# Patient Record
Sex: MALE | Race: WHITE | NOT HISPANIC OR LATINO | Employment: OTHER | URBAN - NONMETROPOLITAN AREA
[De-identification: names, ages, dates, MRNs, and addresses within clinical notes are randomized per-mention and may not be internally consistent; named-entity substitution may affect disease eponyms.]

---

## 2023-10-10 ENCOUNTER — OFFICE VISIT (OUTPATIENT)
Dept: PRIMARY CARE | Facility: CLINIC | Age: 63
End: 2023-10-10
Payer: MEDICARE

## 2023-10-10 VITALS
BODY MASS INDEX: 35.14 KG/M2 | HEART RATE: 88 BPM | SYSTOLIC BLOOD PRESSURE: 142 MMHG | DIASTOLIC BLOOD PRESSURE: 64 MMHG | TEMPERATURE: 97.9 F | HEIGHT: 71 IN | OXYGEN SATURATION: 97 % | WEIGHT: 251 LBS

## 2023-10-10 DIAGNOSIS — R35.1 NOCTURIA ASSOCIATED WITH BENIGN PROSTATIC HYPERPLASIA: Primary | ICD-10-CM

## 2023-10-10 DIAGNOSIS — Z23 FLU VACCINE NEED: ICD-10-CM

## 2023-10-10 DIAGNOSIS — R03.0 ELEVATED BLOOD PRESSURE READING: ICD-10-CM

## 2023-10-10 DIAGNOSIS — N40.1 NOCTURIA ASSOCIATED WITH BENIGN PROSTATIC HYPERPLASIA: Primary | ICD-10-CM

## 2023-10-10 LAB
POC APPEARANCE, URINE: CLEAR
POC BILIRUBIN, URINE: NEGATIVE
POC BLOOD, URINE: NEGATIVE
POC COLOR, URINE: YELLOW
POC GLUCOSE, URINE: NEGATIVE MG/DL
POC KETONES, URINE: NEGATIVE MG/DL
POC LEUKOCYTES, URINE: NEGATIVE
POC NITRITE,URINE: NEGATIVE
POC PH, URINE: 7.5 PH
POC PROTEIN, URINE: NEGATIVE MG/DL
POC SPECIFIC GRAVITY, URINE: 1.02
POC UROBILINOGEN, URINE: 0.2 EU/DL

## 2023-10-10 PROCEDURE — 90682 RIV4 VACC RECOMBINANT DNA IM: CPT | Performed by: FAMILY MEDICINE

## 2023-10-10 PROCEDURE — 1036F TOBACCO NON-USER: CPT | Performed by: FAMILY MEDICINE

## 2023-10-10 PROCEDURE — 84154 ASSAY OF PSA FREE: CPT

## 2023-10-10 PROCEDURE — 85025 COMPLETE CBC W/AUTO DIFF WBC: CPT

## 2023-10-10 PROCEDURE — 80061 LIPID PANEL: CPT

## 2023-10-10 PROCEDURE — 84153 ASSAY OF PSA TOTAL: CPT

## 2023-10-10 PROCEDURE — 81003 URINALYSIS AUTO W/O SCOPE: CPT | Performed by: FAMILY MEDICINE

## 2023-10-10 PROCEDURE — 84443 ASSAY THYROID STIM HORMONE: CPT

## 2023-10-10 PROCEDURE — 99204 OFFICE O/P NEW MOD 45 MIN: CPT | Performed by: FAMILY MEDICINE

## 2023-10-10 PROCEDURE — 80053 COMPREHEN METABOLIC PANEL: CPT

## 2023-10-10 PROCEDURE — 90471 IMMUNIZATION ADMIN: CPT | Performed by: FAMILY MEDICINE

## 2023-10-10 PROCEDURE — 36415 COLL VENOUS BLD VENIPUNCTURE: CPT

## 2023-10-10 ASSESSMENT — ENCOUNTER SYMPTOMS
DYSURIA: 0
NAUSEA: 0
JOINT SWELLING: 0
EYE ITCHING: 0
APPETITE CHANGE: 0
DIFFICULTY URINATING: 1
DYSPHORIC MOOD: 0
RHINORRHEA: 0
SORE THROAT: 0
HEMATURIA: 1
ARTHRALGIAS: 0
VOMITING: 0
NUMBNESS: 0
ABDOMINAL DISTENTION: 1
FLANK PAIN: 0
ACTIVITY CHANGE: 0
DIZZINESS: 0
FEVER: 0
WEAKNESS: 0
UNEXPECTED WEIGHT CHANGE: 0
BACK PAIN: 1
COUGH: 0
BLOOD IN STOOL: 0
CONSTIPATION: 0
SLEEP DISTURBANCE: 0
EYE DISCHARGE: 0
SHORTNESS OF BREATH: 1
MYALGIAS: 0
PALPITATIONS: 0
WHEEZING: 0
DIARRHEA: 0
HEADACHES: 0
LIGHT-HEADEDNESS: 0
ROS GI COMMENTS: GERD
NERVOUS/ANXIOUS: 0
SINUS PRESSURE: 0
ABDOMINAL PAIN: 1

## 2023-10-10 ASSESSMENT — PROMIS GLOBAL HEALTH SCALE
RATE_AVERAGE_PAIN: 3
RATE_SOCIAL_SATISFACTION: GOOD
RATE_PHYSICAL_HEALTH: GOOD
RATE_AVERAGE_FATIGUE: MILD
CARRYOUT_PHYSICAL_ACTIVITIES: COMPLETELY
RATE_GENERAL_HEALTH: GOOD
CARRYOUT_SOCIAL_ACTIVITIES: GOOD
RATE_QUALITY_OF_LIFE: GOOD
EMOTIONAL_PROBLEMS: NEVER
RATE_MENTAL_HEALTH: GOOD

## 2023-10-10 NOTE — PROGRESS NOTES
"Subjective   Patient ID: Ernesto Garzon is a 62 y.o. adult who presents for Establish Care, Annual Exam, Back Pain (For months- \"soreness not pain\"), Epistaxis (Nose Bleed) (Every A.M. when blowing his nose.), and Getting winded quickly.    Back Pain  Associated symptoms include abdominal pain. Pertinent negatives include no chest pain, dysuria, fever, headaches, numbness or weakness.   Epistaxis (Nose Bleed)      no medication   Elevated b/p  History of hematuria     Review of Systems   Constitutional:  Negative for activity change, appetite change, fever and unexpected weight change.   HENT:  Positive for nosebleeds. Negative for congestion, ear pain, postnasal drip, rhinorrhea, sinus pressure and sore throat.    Eyes:  Negative for discharge, itching and visual disturbance.   Respiratory:  Positive for shortness of breath. Negative for cough and wheezing.    Cardiovascular:  Negative for chest pain, palpitations and leg swelling.   Gastrointestinal:  Positive for abdominal distention and abdominal pain. Negative for blood in stool, constipation, diarrhea, nausea and vomiting.        Gerd   Endocrine: Negative for cold intolerance, heat intolerance and polyuria.   Genitourinary:  Positive for difficulty urinating and hematuria. Negative for dysuria and flank pain.        3 x urination    Musculoskeletal:  Positive for back pain. Negative for arthralgias, gait problem, joint swelling and myalgias.   Skin:  Negative for rash.   Allergic/Immunologic: Negative for environmental allergies and food allergies.   Neurological:  Negative for dizziness, syncope, weakness, light-headedness, numbness and headaches.   Psychiatric/Behavioral:  Negative for dysphoric mood and sleep disturbance. The patient is not nervous/anxious.        Objective   /64   Pulse 88   Temp 36.6 °C (97.9 °F)   Ht 1.81 m (5' 11.25\")   Wt 114 kg (251 lb)   SpO2 97%   BMI 34.76 kg/m²     Physical Exam  Vitals and nursing note reviewed. "   Constitutional:       Appearance: Normal appearance.   HENT:      Head: Normocephalic.      Mouth/Throat:      Mouth: Mucous membranes are moist.   Cardiovascular:      Rate and Rhythm: Normal rate and regular rhythm.      Pulses: Normal pulses.      Heart sounds: Normal heart sounds. No murmur heard.     No friction rub. No gallop.   Pulmonary:      Effort: Pulmonary effort is normal. No respiratory distress.      Breath sounds: Normal breath sounds. No wheezing.   Abdominal:      General: Bowel sounds are normal. There is no distension.      Palpations: Abdomen is soft.      Tenderness: There is no abdominal tenderness.   Musculoskeletal:         General: No deformity. Normal range of motion.   Skin:     General: Skin is warm and dry.      Capillary Refill: Capillary refill takes less than 2 seconds.   Neurological:      General: No focal deficit present.      Mental Status: He is alert and oriented to person, place, and time.   Psychiatric:         Mood and Affect: Mood normal.         Assessment/Plan   Diagnoses and all orders for this visit:  Nocturia associated with benign prostatic hyperplasia  -     CBC and Auto Differential  -     TSH with reflex to Free T4 if abnormal  -     PSA, total and free  -     POCT UA Automated manually resulted  Elevated blood pressure reading  -     Comprehensive Metabolic Panel  -     Lipid Panel  -     POCT UA Automated manually resulted

## 2023-10-11 LAB
ALBUMIN SERPL BCP-MCNC: 4.4 G/DL (ref 3.4–5)
ALP SERPL-CCNC: 66 U/L (ref 33–136)
ALT SERPL W P-5'-P-CCNC: 63 U/L (ref 7–52)
ANION GAP SERPL CALC-SCNC: 16 MMOL/L (ref 10–20)
AST SERPL W P-5'-P-CCNC: 27 U/L (ref 9–39)
BASOPHILS # BLD AUTO: 0.03 X10*3/UL (ref 0–0.1)
BASOPHILS NFR BLD AUTO: 0.4 %
BILIRUB SERPL-MCNC: 0.4 MG/DL (ref 0–1.2)
BUN SERPL-MCNC: 9 MG/DL (ref 6–23)
CALCIUM SERPL-MCNC: 9.9 MG/DL (ref 8.6–10.6)
CHLORIDE SERPL-SCNC: 102 MMOL/L (ref 98–107)
CHOLEST SERPL-MCNC: 184 MG/DL (ref 0–199)
CHOLESTEROL/HDL RATIO: 5.2
CO2 SERPL-SCNC: 27 MMOL/L (ref 21–32)
CREAT SERPL-MCNC: 0.91 MG/DL (ref 0.5–1.3)
EOSINOPHIL # BLD AUTO: 0.3 X10*3/UL (ref 0–0.7)
EOSINOPHIL NFR BLD AUTO: 3.7 %
ERYTHROCYTE [DISTWIDTH] IN BLOOD BY AUTOMATED COUNT: 12.9 % (ref 11.5–14.5)
GFR SERPL CREATININE-BSD FRML MDRD: 71 ML/MIN/1.73M*2
GLUCOSE SERPL-MCNC: 97 MG/DL (ref 74–99)
HCT VFR BLD AUTO: 47 % (ref 36–52)
HDLC SERPL-MCNC: 35.5 MG/DL
HGB BLD-MCNC: 15 G/DL (ref 12–17.5)
IMM GRANULOCYTES # BLD AUTO: 0.03 X10*3/UL (ref 0–0.7)
IMM GRANULOCYTES NFR BLD AUTO: 0.4 % (ref 0–0.9)
LDLC SERPL CALC-MCNC: 84 MG/DL (ref 140–190)
LYMPHOCYTES # BLD AUTO: 1.7 X10*3/UL (ref 1.2–4.8)
LYMPHOCYTES NFR BLD AUTO: 21.1 %
MCH RBC QN AUTO: 32.9 PG (ref 26–34)
MCHC RBC AUTO-ENTMCNC: 31.9 G/DL (ref 32–36)
MCV RBC AUTO: 103 FL (ref 80–100)
MONOCYTES # BLD AUTO: 0.58 X10*3/UL (ref 0.1–1)
MONOCYTES NFR BLD AUTO: 7.2 %
NEUTROPHILS # BLD AUTO: 5.42 X10*3/UL (ref 1.2–7.7)
NEUTROPHILS NFR BLD AUTO: 67.2 %
NON HDL CHOLESTEROL: 149 MG/DL (ref 0–149)
NRBC BLD-RTO: 0 /100 WBCS (ref 0–0)
PLATELET # BLD AUTO: 151 X10*3/UL (ref 150–450)
PMV BLD AUTO: 12 FL (ref 7.5–11.5)
POTASSIUM SERPL-SCNC: 4.6 MMOL/L (ref 3.5–5.3)
PROT SERPL-MCNC: 7.6 G/DL (ref 6.4–8.2)
RBC # BLD AUTO: 4.56 X10*6/UL (ref 4–5.9)
SODIUM SERPL-SCNC: 140 MMOL/L (ref 136–145)
TRIGL SERPL-MCNC: 322 MG/DL (ref 0–149)
TSH SERPL-ACNC: 3.39 MIU/L (ref 0.44–3.98)
VLDL: 64 MG/DL (ref 0–40)
WBC # BLD AUTO: 8.1 X10*3/UL (ref 4.4–11.3)

## 2023-10-13 LAB
PSA FREE MFR SERPL: 17 %
PSA FREE SERPL-MCNC: 0.2 NG/ML
PSA SERPL IA-MCNC: 1.2 NG/ML (ref 0–4)

## 2023-10-17 ENCOUNTER — TELEPHONE (OUTPATIENT)
Dept: PRIMARY CARE | Facility: CLINIC | Age: 63
End: 2023-10-17
Payer: MEDICARE

## 2023-10-17 NOTE — TELEPHONE ENCOUNTER
----- Message from Kylie Sawant DO sent at 10/15/2023  6:39 PM EDT -----  Psa is normal   So is his thyroid and cbc  Blood sugar  NORMAL. KIDNEY FUNCTION IS NORMAL FOR AGE   Lipid , THE LDL or bad are elevated  I HAVE NO OLD TO COMPARE  LOWER FAT AND SALT DIET AND FOLLOW IN 6 MONTHS   LIPID AND CMP

## 2023-10-18 NOTE — TELEPHONE ENCOUNTER
Ernesto called back and notified of results/recommendations. Scheduled for lab draw 4/11/24 and follow up 4/15/24

## 2024-04-11 ENCOUNTER — CLINICAL SUPPORT (OUTPATIENT)
Dept: PRIMARY CARE | Facility: CLINIC | Age: 64
End: 2024-04-11
Payer: MEDICARE

## 2024-04-11 DIAGNOSIS — R03.0 ELEVATED BLOOD PRESSURE READING: ICD-10-CM

## 2024-04-11 LAB
ALBUMIN SERPL BCP-MCNC: 4.3 G/DL (ref 3.4–5)
ALP SERPL-CCNC: 67 U/L (ref 33–136)
ALT SERPL W P-5'-P-CCNC: 90 U/L (ref 7–52)
ANION GAP SERPL CALC-SCNC: 13 MMOL/L (ref 10–20)
AST SERPL W P-5'-P-CCNC: 41 U/L (ref 9–39)
BILIRUB SERPL-MCNC: 0.5 MG/DL (ref 0–1.2)
BUN SERPL-MCNC: 10 MG/DL (ref 6–23)
CALCIUM SERPL-MCNC: 9.6 MG/DL (ref 8.6–10.3)
CHLORIDE SERPL-SCNC: 101 MMOL/L (ref 98–107)
CHOLEST SERPL-MCNC: 208 MG/DL (ref 0–199)
CHOLESTEROL/HDL RATIO: 6
CO2 SERPL-SCNC: 30 MMOL/L (ref 21–32)
CREAT SERPL-MCNC: 0.94 MG/DL (ref 0.5–1.3)
EGFRCR SERPLBLD CKD-EPI 2021: 68 ML/MIN/1.73M*2
GLUCOSE SERPL-MCNC: 89 MG/DL (ref 74–99)
HDLC SERPL-MCNC: 34.5 MG/DL
LDLC SERPL CALC-MCNC: 118 MG/DL
NON HDL CHOLESTEROL: 174 MG/DL (ref 0–149)
POTASSIUM SERPL-SCNC: 4.6 MMOL/L (ref 3.5–5.3)
PROT SERPL-MCNC: 7.4 G/DL (ref 6.4–8.2)
SODIUM SERPL-SCNC: 139 MMOL/L (ref 136–145)
TRIGL SERPL-MCNC: 277 MG/DL (ref 0–149)
VLDL: 55 MG/DL (ref 0–40)

## 2024-04-11 PROCEDURE — 36415 COLL VENOUS BLD VENIPUNCTURE: CPT

## 2024-04-11 PROCEDURE — 80061 LIPID PANEL: CPT

## 2024-04-11 PROCEDURE — 80053 COMPREHEN METABOLIC PANEL: CPT

## 2024-04-15 ENCOUNTER — OFFICE VISIT (OUTPATIENT)
Dept: PRIMARY CARE | Facility: CLINIC | Age: 64
End: 2024-04-15
Payer: MEDICARE

## 2024-04-15 VITALS
WEIGHT: 264 LBS | DIASTOLIC BLOOD PRESSURE: 70 MMHG | SYSTOLIC BLOOD PRESSURE: 116 MMHG | BODY MASS INDEX: 36.56 KG/M2 | TEMPERATURE: 97.2 F | OXYGEN SATURATION: 97 % | HEART RATE: 97 BPM

## 2024-04-15 DIAGNOSIS — E78.5 DYSLIPIDEMIA: Primary | ICD-10-CM

## 2024-04-15 PROCEDURE — 99214 OFFICE O/P EST MOD 30 MIN: CPT | Performed by: FAMILY MEDICINE

## 2024-04-15 NOTE — PATIENT INSTRUCTIONS
THE ALCOHOL MAY BE RAISING THE CHOLESTEROL   DIETARY   DAILY ACTIVITY    FOLLOW IN 6 MONTHS   DO MORE  WATER MAG AND POTASSIUM

## 2024-04-15 NOTE — PROGRESS NOTES
Subjective   Patient ID: Ernesto Garzon is a 63 y.o. adult who presents for Hip Pain (R SIDED), Ankle Pain (BILATERAL), and Follow-up (Discuss labwork-in chart.).  ALSO LEG AND NECK CRAMPS   TRYING MAGNESIUM   TRY MORE WATER     HPI SEE HISTORY DISCUSSED ABOVE  SAW ONE OTHER TIME FOR BPH     DISCUSSED HIS LIPID PANEL WHICH IS ABNORMAL AND MAY RELATED TO HIS ALCOHOL CONSUMPTION   LIVER ENZYMES ABNORMAL AND NEED FOLLOWED     Review of Systems   Constitutional:  Negative for activity change, appetite change, fever and unexpected weight change.   HENT:  Negative for congestion, ear pain, postnasal drip, rhinorrhea, sinus pressure and sore throat.    Eyes:  Negative for discharge, itching and visual disturbance.   Respiratory:  Negative for cough, shortness of breath and wheezing.    Cardiovascular:  Negative for chest pain, palpitations and leg swelling.   Gastrointestinal:  Negative for abdominal pain, blood in stool, constipation, diarrhea, nausea and vomiting.   Endocrine: Negative for cold intolerance, heat intolerance and polyuria.   Genitourinary:  Positive for difficulty urinating. Negative for dysuria, flank pain and hematuria.   Musculoskeletal:  Positive for arthralgias and myalgias. Negative for gait problem and joint swelling.        HIP AND ANKLE PAIN    Skin:  Negative for rash.   Allergic/Immunologic: Negative for environmental allergies and food allergies.   Neurological:  Negative for dizziness, syncope, weakness, light-headedness, numbness and headaches.   Psychiatric/Behavioral:  Negative for dysphoric mood and sleep disturbance. The patient is not nervous/anxious.        Objective   /70   Pulse 97   Temp 36.2 °C (97.2 °F)   Wt 120 kg (264 lb)   SpO2 97%   BMI 36.56 kg/m²     Physical Exam  Vitals and nursing note reviewed.   Constitutional:       Appearance: Normal appearance. He is obese.   HENT:      Head: Normocephalic.   Cardiovascular:      Rate and Rhythm: Normal rate and regular  rhythm.      Pulses: Normal pulses.      Heart sounds: Normal heart sounds. No murmur heard.     No friction rub. No gallop.   Pulmonary:      Effort: Pulmonary effort is normal. No respiratory distress.      Breath sounds: Normal breath sounds. No wheezing.   Abdominal:      General: Bowel sounds are normal.      Palpations: Abdomen is soft. There is no mass.      Tenderness: There is no right CVA tenderness or left CVA tenderness.   Musculoskeletal:         General: Tenderness and deformity present.      Comments: DECREASED AND PAINFUL; ROM AND MUSCLE CRAMPS    Skin:     General: Skin is warm and dry.      Capillary Refill: Capillary refill takes less than 2 seconds.   Neurological:      General: No focal deficit present.      Mental Status: He is alert and oriented to person, place, and time.   Psychiatric:         Mood and Affect: Mood normal.         Assessment/Plan   Diagnoses and all orders for this visit:  Dyslipidemia

## 2024-04-16 ASSESSMENT — ENCOUNTER SYMPTOMS
EYE DISCHARGE: 0
RHINORRHEA: 0
DYSPHORIC MOOD: 0
WHEEZING: 0
VOMITING: 0
FEVER: 0
EYE ITCHING: 0
SINUS PRESSURE: 0
APPETITE CHANGE: 0
WEAKNESS: 0
ABDOMINAL PAIN: 0
JOINT SWELLING: 0
NERVOUS/ANXIOUS: 0
MYALGIAS: 1
ARTHRALGIAS: 1
UNEXPECTED WEIGHT CHANGE: 0
SLEEP DISTURBANCE: 0
HEADACHES: 0
ACTIVITY CHANGE: 0
LIGHT-HEADEDNESS: 0
DIZZINESS: 0
SHORTNESS OF BREATH: 0
PALPITATIONS: 0
HEMATURIA: 0
DYSURIA: 0
SORE THROAT: 0
DIARRHEA: 0
NAUSEA: 0
COUGH: 0
NUMBNESS: 0
FLANK PAIN: 0
CONSTIPATION: 0
DIFFICULTY URINATING: 1
BLOOD IN STOOL: 0

## 2024-05-06 ENCOUNTER — OFFICE VISIT (OUTPATIENT)
Dept: PRIMARY CARE | Facility: CLINIC | Age: 64
End: 2024-05-06
Payer: MEDICARE

## 2024-05-06 VITALS
SYSTOLIC BLOOD PRESSURE: 140 MMHG | OXYGEN SATURATION: 98 % | TEMPERATURE: 97.7 F | BODY MASS INDEX: 35.73 KG/M2 | HEART RATE: 95 BPM | DIASTOLIC BLOOD PRESSURE: 78 MMHG | WEIGHT: 258 LBS

## 2024-05-06 DIAGNOSIS — R05.1 ACUTE COUGH: ICD-10-CM

## 2024-05-06 DIAGNOSIS — J20.9 ACUTE BRONCHITIS, UNSPECIFIED ORGANISM: Primary | ICD-10-CM

## 2024-05-06 LAB
FLUAV RNA RESP QL NAA+PROBE: NOT DETECTED
FLUBV RNA RESP QL NAA+PROBE: NOT DETECTED
RSV RNA RESP QL NAA+PROBE: NOT DETECTED
SARS-COV-2 RNA RESP QL NAA+PROBE: NOT DETECTED

## 2024-05-06 PROCEDURE — 99214 OFFICE O/P EST MOD 30 MIN: CPT

## 2024-05-06 PROCEDURE — 87637 SARSCOV2&INF A&B&RSV AMP PRB: CPT

## 2024-05-06 RX ORDER — PREDNISONE 10 MG/1
10 TABLET ORAL 3 TIMES DAILY
Qty: 9 TABLET | Refills: 0 | Status: SHIPPED | OUTPATIENT
Start: 2024-05-06 | End: 2024-05-09

## 2024-05-06 RX ORDER — AZITHROMYCIN 250 MG/1
TABLET, FILM COATED ORAL DAILY
Qty: 6 TABLET | Refills: 0 | Status: SHIPPED | OUTPATIENT
Start: 2024-05-06 | End: 2024-05-11

## 2024-05-06 ASSESSMENT — ENCOUNTER SYMPTOMS
SHORTNESS OF BREATH: 1
ALLERGIC/IMMUNOLOGIC NEGATIVE: 1
EYES NEGATIVE: 1
DIARRHEA: 0
VOMITING: 0
ABDOMINAL PAIN: 0
STRIDOR: 0
GASTROINTESTINAL NEGATIVE: 1
SORE THROAT: 1
COUGH: 1
CARDIOVASCULAR NEGATIVE: 1
HEMATOLOGIC/LYMPHATIC NEGATIVE: 1
ARTHRALGIAS: 1
FATIGUE: 1
HEADACHES: 1
SWOLLEN GLANDS: 1
ENDOCRINE NEGATIVE: 1
TROUBLE SWALLOWING: 1
PSYCHIATRIC NEGATIVE: 1
NECK PAIN: 0
HOARSE VOICE: 0

## 2024-05-06 NOTE — PROGRESS NOTES
Subjective   Patient ID: Ernesto Garzon is a 63 y.o. adult who presents for Sick Visit (Ernesto is here for a sore throat and congestion. Started last week and has gone through 2 bottles of sore throat relief with no improvement. Is worse at night with coughing. Has been in contact with others who have been coughing. ).  Sore Throat   Associated symptoms include congestion, coughing, headaches, shortness of breath, swollen glands and trouble swallowing. Pertinent negatives include no abdominal pain, diarrhea, drooling, ear discharge, ear pain, hoarse voice, plugged ear sensation, neck pain, stridor or vomiting. He has had no exposure to strep or mono.       Vitals:    05/06/24 1123   BP: 140/78   Pulse: 95   Temp: 36.5 °C (97.7 °F)   SpO2: 98%       Review of Systems   Constitutional:  Positive for fatigue.   HENT:  Positive for congestion, sore throat and trouble swallowing. Negative for drooling, ear discharge, ear pain and hoarse voice.    Eyes: Negative.    Respiratory:  Positive for cough and shortness of breath. Negative for stridor.    Cardiovascular: Negative.    Gastrointestinal: Negative.  Negative for abdominal pain, diarrhea and vomiting.   Endocrine: Negative.    Genitourinary: Negative.    Musculoskeletal:  Positive for arthralgias. Negative for neck pain.   Skin: Negative.    Allergic/Immunologic: Negative.    Neurological:  Positive for headaches.   Hematological: Negative.    Psychiatric/Behavioral: Negative.         Objective   Physical Exam  Vitals reviewed.   Constitutional:       General: He is not in acute distress.     Appearance: Normal appearance. He is normal weight. He is not toxic-appearing.   HENT:      Head: Normocephalic.      Nose: Rhinorrhea present.      Mouth/Throat:      Mouth: Mucous membranes are moist.   Cardiovascular:      Rate and Rhythm: Normal rate and regular rhythm.      Pulses: Normal pulses.   Pulmonary:      Effort: Pulmonary effort is normal.      Breath sounds: Wheezing  present.   Neurological:      Mental Status: He is alert.         Assessment/Plan   Problem List Items Addressed This Visit    None  Visit Diagnoses       Acute bronchitis, unspecified organism    -  Primary    Relevant Medications    predniSONE (Deltasone) 10 mg tablet    azithromycin (Zithromax) 250 mg tablet    Acute cough        Relevant Orders    Influenza A, and B PCR    RSV PCR    Sars-CoV-2 PCR                 Thank you for coming in today, please call my office if you have any concerns or questions.     Baltazar GORDON, CNP

## 2024-05-08 ENCOUNTER — TELEPHONE (OUTPATIENT)
Dept: PRIMARY CARE | Facility: CLINIC | Age: 64
End: 2024-05-08
Payer: MEDICARE

## 2024-05-08 NOTE — TELEPHONE ENCOUNTER
----- Message from ABDULLAHI Padilla sent at 5/7/2024  8:18 AM EDT -----  Results are normal, please notify the patient.

## 2024-05-08 NOTE — LETTER
May 16, 2024     Ernesto Garzon  [address] [facility] [address] [place] [address]    Patient: Ernesto Josephmelvin   YOB: 1960   Date of Visit: 5/8/2024       Dear Dr. Ernesto Garzon:    We have been attempting to contact you in regards to your recent test results. Please contact the office at [phone], to ensure we have the correct contact information.     Sincerely,     Hamlet Sam MA

## 2024-10-24 ENCOUNTER — APPOINTMENT (OUTPATIENT)
Dept: PRIMARY CARE | Facility: CLINIC | Age: 64
End: 2024-10-24
Payer: MEDICARE

## 2024-10-24 DIAGNOSIS — E78.5 DYSLIPIDEMIA: ICD-10-CM

## 2024-10-24 PROCEDURE — 80053 COMPREHEN METABOLIC PANEL: CPT

## 2024-10-24 PROCEDURE — 80061 LIPID PANEL: CPT

## 2024-10-25 LAB
ALBUMIN SERPL BCP-MCNC: 4 G/DL (ref 3.4–5)
ALP SERPL-CCNC: 64 U/L (ref 33–136)
ALT SERPL W P-5'-P-CCNC: 87 U/L (ref 7–52)
ANION GAP SERPL CALC-SCNC: 16 MMOL/L (ref 10–20)
AST SERPL W P-5'-P-CCNC: 38 U/L (ref 9–39)
BILIRUB SERPL-MCNC: 0.6 MG/DL (ref 0–1.2)
BUN SERPL-MCNC: 7 MG/DL (ref 6–23)
CALCIUM SERPL-MCNC: 9.6 MG/DL (ref 8.6–10.6)
CHLORIDE SERPL-SCNC: 101 MMOL/L (ref 98–107)
CHOLEST SERPL-MCNC: 184 MG/DL (ref 0–199)
CHOLESTEROL/HDL RATIO: 5.3
CO2 SERPL-SCNC: 28 MMOL/L (ref 21–32)
CREAT SERPL-MCNC: 1.04 MG/DL (ref 0.5–1.3)
EGFRCR SERPLBLD CKD-EPI 2021: 60 ML/MIN/1.73M*2
GLUCOSE SERPL-MCNC: 85 MG/DL (ref 74–99)
HDLC SERPL-MCNC: 34.9 MG/DL
LDLC SERPL CALC-MCNC: 104 MG/DL
NON HDL CHOLESTEROL: 149 MG/DL (ref 0–149)
POTASSIUM SERPL-SCNC: 4.9 MMOL/L (ref 3.5–5.3)
PROT SERPL-MCNC: 7.1 G/DL (ref 6.4–8.2)
SODIUM SERPL-SCNC: 140 MMOL/L (ref 136–145)
TRIGL SERPL-MCNC: 226 MG/DL (ref 0–149)
VLDL: 45 MG/DL (ref 0–40)

## 2024-10-28 ENCOUNTER — APPOINTMENT (OUTPATIENT)
Dept: PRIMARY CARE | Facility: CLINIC | Age: 64
End: 2024-10-28
Payer: MEDICARE

## 2024-10-29 ENCOUNTER — APPOINTMENT (OUTPATIENT)
Dept: PRIMARY CARE | Facility: CLINIC | Age: 64
End: 2024-10-29
Payer: MEDICARE

## 2024-10-29 VITALS
BODY MASS INDEX: 37.39 KG/M2 | OXYGEN SATURATION: 97 % | SYSTOLIC BLOOD PRESSURE: 136 MMHG | DIASTOLIC BLOOD PRESSURE: 76 MMHG | HEART RATE: 92 BPM | TEMPERATURE: 97.4 F | WEIGHT: 270 LBS

## 2024-10-29 DIAGNOSIS — Z23 FLU VACCINE NEED: ICD-10-CM

## 2024-10-29 DIAGNOSIS — E78.5 DYSLIPIDEMIA: Primary | ICD-10-CM

## 2024-10-29 PROCEDURE — 90471 IMMUNIZATION ADMIN: CPT | Performed by: FAMILY MEDICINE

## 2024-10-29 PROCEDURE — 1036F TOBACCO NON-USER: CPT | Performed by: FAMILY MEDICINE

## 2024-10-29 PROCEDURE — 99214 OFFICE O/P EST MOD 30 MIN: CPT | Performed by: FAMILY MEDICINE

## 2024-10-29 PROCEDURE — 90656 IIV3 VACC NO PRSV 0.5 ML IM: CPT | Performed by: FAMILY MEDICINE

## 2024-10-29 ASSESSMENT — ENCOUNTER SYMPTOMS
SORE THROAT: 0
JOINT SWELLING: 0
WEAKNESS: 0
UNEXPECTED WEIGHT CHANGE: 0
HEADACHES: 0
MYALGIAS: 1
NAUSEA: 0
CONSTIPATION: 0
DIARRHEA: 0
FEVER: 0
APPETITE CHANGE: 0
EYE ITCHING: 0
FLANK PAIN: 0
PALPITATIONS: 0
WHEEZING: 0
ARTHRALGIAS: 0
NERVOUS/ANXIOUS: 0
ABDOMINAL PAIN: 0
EYE DISCHARGE: 0
DYSURIA: 0
SHORTNESS OF BREATH: 0
ACTIVITY CHANGE: 0
VOMITING: 0
SINUS PRESSURE: 0
SLEEP DISTURBANCE: 0
NUMBNESS: 0
COUGH: 1
DIZZINESS: 0
RHINORRHEA: 0
BACK PAIN: 1
DYSPHORIC MOOD: 0
LIGHT-HEADEDNESS: 0
HEMATURIA: 0
BLOOD IN STOOL: 0

## 2024-11-15 ENCOUNTER — APPOINTMENT (OUTPATIENT)
Dept: PRIMARY CARE | Facility: CLINIC | Age: 64
End: 2024-11-15
Payer: MEDICARE

## 2024-11-18 ENCOUNTER — APPOINTMENT (OUTPATIENT)
Dept: PRIMARY CARE | Facility: CLINIC | Age: 64
End: 2024-11-18
Payer: MEDICARE

## 2024-11-22 ENCOUNTER — APPOINTMENT (OUTPATIENT)
Dept: PRIMARY CARE | Facility: CLINIC | Age: 64
End: 2024-11-22
Payer: MEDICARE

## 2024-11-22 VITALS
TEMPERATURE: 97.8 F | BODY MASS INDEX: 37.12 KG/M2 | DIASTOLIC BLOOD PRESSURE: 64 MMHG | OXYGEN SATURATION: 95 % | HEART RATE: 84 BPM | WEIGHT: 268 LBS | SYSTOLIC BLOOD PRESSURE: 126 MMHG

## 2024-11-22 DIAGNOSIS — L72.3 SEBACEOUS CYST: ICD-10-CM

## 2024-11-22 DIAGNOSIS — L21.9 SEBORRHEIC DERMATITIS: Primary | ICD-10-CM

## 2024-11-22 PROCEDURE — 99213 OFFICE O/P EST LOW 20 MIN: CPT | Performed by: FAMILY MEDICINE

## 2024-11-22 RX ORDER — TRIAMCINOLONE ACETONIDE 1 MG/G
CREAM TOPICAL 2 TIMES DAILY
Qty: 15 G | Refills: 5 | Status: SHIPPED | OUTPATIENT
Start: 2024-11-22

## 2024-11-24 PROBLEM — L72.3 SEBACEOUS CYST: Status: ACTIVE | Noted: 2024-11-24

## 2024-11-24 PROBLEM — L21.9 SEBORRHEIC DERMATITIS: Status: ACTIVE | Noted: 2024-11-24

## 2024-11-24 NOTE — PROGRESS NOTES
"Subjective   Patient ID: Ernesto Garzon is a 64 y.o. adult who presents for Procedure (Punch biopsy- Mid chest and L shoulder.).    HPI REVIEWED THE LESIONS WITH THE PATIENT AND HE DECLINES BIOPSY AT THIS TIME   \"WE WILL JUST WATCH THEM\"   Appraised patient of the fact that which I think the one is wart like I can not know for sure unless biopsy confires the same     Review of SystemsSINCE THE  VISIT NO INTERVAL HISTORICAL CHANGES IN ANY OF THE 12 SYSTEMS REVIEWED OTHER THAN the two skin lesions he has c/o    Objective   /64   Pulse 84   Temp 36.6 °C (97.8 °F)   Wt 122 kg (268 lb)   SpO2 95%   BMI 37.12 kg/m²     Physical ExamSINCE RECENT VISIT NO INTERVAL PHYSICAL CHANGES IN ANY OF THE 12 SYSTEMS REVIEWED OTHER THAN lesion at xiphoid which appears scaly and raised the other is somewhat fluctuant at the left shoulder that he squeezes and \"stuff comes out\" but that is painful   #1 is like seborrheic  in nature #2 is probably a sebaceous in nature     Assessment/Plan   Problem List Items Addressed This Visit             ICD-10-CM    Seborrheic dermatitis - Primary L21.9    Relevant Medications    triamcinolone (Kenalog) 0.1 % cream    Sebaceous cyst L72.3          "

## 2025-06-23 ENCOUNTER — APPOINTMENT (OUTPATIENT)
Dept: PRIMARY CARE | Facility: CLINIC | Age: 65
End: 2025-06-23
Payer: MEDICARE

## 2025-06-23 VITALS
SYSTOLIC BLOOD PRESSURE: 126 MMHG | BODY MASS INDEX: 38.06 KG/M2 | OXYGEN SATURATION: 98 % | HEART RATE: 93 BPM | TEMPERATURE: 96.9 F | WEIGHT: 274.8 LBS | DIASTOLIC BLOOD PRESSURE: 58 MMHG

## 2025-06-23 DIAGNOSIS — E66.01 OBESITY, MORBID (MULTI): ICD-10-CM

## 2025-06-23 DIAGNOSIS — E78.5 DYSLIPIDEMIA: Primary | ICD-10-CM

## 2025-06-23 DIAGNOSIS — I73.9 CLAUDICATION: ICD-10-CM

## 2025-06-23 DIAGNOSIS — R35.1 NOCTURIA: ICD-10-CM

## 2025-06-23 PROCEDURE — 99214 OFFICE O/P EST MOD 30 MIN: CPT | Performed by: FAMILY MEDICINE

## 2025-06-23 PROCEDURE — 1036F TOBACCO NON-USER: CPT | Performed by: FAMILY MEDICINE

## 2025-06-23 PROCEDURE — 1159F MED LIST DOCD IN RCRD: CPT | Performed by: FAMILY MEDICINE

## 2025-06-23 ASSESSMENT — ENCOUNTER SYMPTOMS
UNEXPECTED WEIGHT CHANGE: 1
SHORTNESS OF BREATH: 1

## 2025-06-23 NOTE — PROGRESS NOTES
"Subjective   Patient ID: Ernesto Garzon is a 64 y.o. adult who presents for Follow-up (Ernesto is here because we want to get blood work done also he's complaining that the back of his legs are getting worse, He can't barely walk now).  HPI CLAUDICATION AND EXCESS WEIGHT GAIN  HX OF HEPATITIS   The 10-year ASCVD risk score (Luis Angel BROWNLEE, et al., 2019) is: 14.3%    Values used to calculate the score:      Age: 64 years      Sex: Male      Is Non- : No      Diabetic: No      Tobacco smoker: No      Systolic Blood Pressure: 126 mmHg      Is BP treated: No      HDL Cholesterol: 34.9 mg/dL      Total Cholesterol: 184 mg/dL   Review of Systems   Constitutional:  Positive for unexpected weight change.        TRUNCAL IN NATURE    Respiratory:  Positive for shortness of breath.    Genitourinary:         NOCTURIA            4/6/2018    12:36 PM 10/10/2023     9:47 AM 4/15/2024    10:25 AM 5/6/2024    11:23 AM 10/29/2024    10:20 AM 11/22/2024    10:00 AM 6/23/2025     9:32 AM   Vitals   Systolic  142 116 140 136 126 126   Diastolic  64 70 78 76 64 58   Heart Rate  88 97 95 92 84 93   Temp  36.6 °C (97.9 °F) 36.2 °C (97.2 °F) 36.5 °C (97.7 °F) 36.3 °C (97.4 °F) 36.6 °C (97.8 °F) 36.1 °C (96.9 °F)   Height  1.81 m (5' 11.25\")        Weight (lb) 259.92 251 264 258 270 268 274.8   BMI  34.76 kg/m2 36.56 kg/m2 35.73 kg/m2 37.39 kg/m2 37.12 kg/m2 38.06 kg/m2   BSA (m2)  2.39 m2 2.46 m2 2.43 m2 2.48 m2 2.48 m2 2.51 m2   Visit Report  Report Report Report Report Report Report       Body mass index is 38.06 kg/m².      Objective   Physical Exam  Vitals and nursing note reviewed.   Constitutional:       Appearance: Normal appearance. He is obese.   HENT:      Head: Normocephalic.   Cardiovascular:      Rate and Rhythm: Normal rate and regular rhythm.      Pulses:           Dorsalis pedis pulses are 1+ on the right side and 1+ on the left side.        Posterior tibial pulses are 1+ on the right side and 1+ on the left " side.      Heart sounds: Normal heart sounds. No murmur heard.     No friction rub. No gallop.   Pulmonary:      Effort: Pulmonary effort is normal. No respiratory distress.      Breath sounds: No wheezing.      Comments: DIMINISHED IN ALL LUNG FIELDS   Abdominal:      General: There is no distension.      Palpations: Abdomen is soft.      Tenderness: There is no abdominal tenderness.   Musculoskeletal:         General: Normal range of motion.      Right foot: Deformity present.      Left foot: Deformity present.   Feet:      Right foot:      Protective Sensation:   3 sites sensed.      Left foot:      Protective Sensation: 3 sites tested.  3 sites sensed.   Skin:     General: Skin is warm and dry.      Capillary Refill: Capillary refill takes less than 2 seconds.   Neurological:      General: No focal deficit present.      Mental Status: He is alert and oriented to person, place, and time.   Psychiatric:         Mood and Affect: Mood normal.         Assessment/Plan   Problem List Items Addressed This Visit       Dyslipidemia - Primary    Obesity, morbid (Multi)    Labs taken today            Nocturia     Other Visit Diagnoses         Claudication        Relevant Orders    Vascular US lower extremity arterial duplex bilateral               Kylie Sawant DO

## 2025-06-26 ENCOUNTER — CLINICAL SUPPORT (OUTPATIENT)
Dept: PRIMARY CARE | Facility: CLINIC | Age: 65
End: 2025-06-26
Payer: MEDICARE

## 2025-06-26 DIAGNOSIS — I73.9 CLAUDICATION: ICD-10-CM

## 2025-06-26 DIAGNOSIS — R03.0 ELEVATED BLOOD PRESSURE READING: ICD-10-CM

## 2025-06-26 DIAGNOSIS — Z12.5 SCREENING FOR PROSTATE CANCER: ICD-10-CM

## 2025-06-26 DIAGNOSIS — E66.01 OBESITY, MORBID (MULTI): ICD-10-CM

## 2025-06-26 DIAGNOSIS — E78.5 DYSLIPIDEMIA: ICD-10-CM

## 2025-06-28 LAB
ALBUMIN SERPL-MCNC: 4.3 G/DL (ref 3.6–5.1)
ALP SERPL-CCNC: 64 U/L (ref 35–144)
ALT SERPL-CCNC: 77 U/L (ref 9–46)
ANION GAP SERPL CALCULATED.4IONS-SCNC: 8 MMOL/L (CALC) (ref 7–17)
AST SERPL-CCNC: 42 U/L (ref 10–35)
BILIRUB SERPL-MCNC: 0.6 MG/DL (ref 0.2–1.2)
BUN SERPL-MCNC: 8 MG/DL (ref 7–25)
CALCIUM SERPL-MCNC: 9.3 MG/DL (ref 8.6–10.3)
CHLORIDE SERPL-SCNC: 101 MMOL/L (ref 98–110)
CHOLEST SERPL-MCNC: 173 MG/DL
CHOLEST/HDLC SERPL: 4.4 (CALC)
CO2 SERPL-SCNC: 29 MMOL/L (ref 20–32)
CREAT SERPL-MCNC: 0.93 MG/DL (ref 0.7–1.35)
EGFRCR SERPLBLD CKD-EPI 2021: 91 ML/MIN/1.73M2
GLUCOSE SERPL-MCNC: 95 MG/DL (ref 65–99)
HDLC SERPL-MCNC: 39 MG/DL
LDLC SERPL CALC-MCNC: 107 MG/DL (CALC)
NONHDLC SERPL-MCNC: 134 MG/DL (CALC)
POTASSIUM SERPL-SCNC: 4.7 MMOL/L (ref 3.5–5.3)
PROT SERPL-MCNC: 7.2 G/DL (ref 6.1–8.1)
PSA SERPL-MCNC: 1.04 NG/ML
SODIUM SERPL-SCNC: 138 MMOL/L (ref 135–146)
TRIGL SERPL-MCNC: 159 MG/DL
TSH SERPL-ACNC: 2.69 MIU/L (ref 0.4–4.5)

## 2025-07-07 ENCOUNTER — HOSPITAL ENCOUNTER (OUTPATIENT)
Dept: RADIOLOGY | Facility: HOSPITAL | Age: 65
Discharge: HOME | End: 2025-07-07
Payer: MEDICARE

## 2025-07-07 DIAGNOSIS — I73.9 CLAUDICATION: ICD-10-CM

## 2025-07-07 PROCEDURE — 93922 UPR/L XTREMITY ART 2 LEVELS: CPT

## 2025-07-07 PROCEDURE — 93922 UPR/L XTREMITY ART 2 LEVELS: CPT | Performed by: SURGERY

## 2025-07-15 ENCOUNTER — TELEPHONE (OUTPATIENT)
Dept: PRIMARY CARE | Facility: CLINIC | Age: 65
End: 2025-07-15
Payer: MEDICARE

## 2025-07-15 DIAGNOSIS — I73.9 PAD (PERIPHERAL ARTERY DISEASE): Primary | ICD-10-CM

## 2025-07-15 DIAGNOSIS — E78.5 DYSLIPIDEMIA: ICD-10-CM

## 2025-07-15 RX ORDER — ATORVASTATIN CALCIUM 10 MG/1
10 TABLET, FILM COATED ORAL DAILY
Qty: 100 TABLET | Refills: 3 | Status: SHIPPED | OUTPATIENT
Start: 2025-07-15 | End: 2026-08-19

## 2025-07-15 NOTE — TELEPHONE ENCOUNTER
----- Message from Kylie Sawant sent at 7/15/2025  8:12 AM EDT -----  SORRY FOR DELAY  HE HAS SOME DECREASED CIRCULATION IN THAT LEFT LEG  HE NEEDS TO CONSIDER A STATIN DRUG FOR THERAPY   ALSO TO SEE A VASCULAR SPECIALIST  ILL MAKE A REFERRAL AND SEND THE MEDICINE. THEN FOLLOW.  WALKING IS ONE OF THE MAIN TREATMENTS. WALK UNTIL IT HURTS THEN REST THEN WALK MORE TO BUILD OTHER BLOOD VESSELS   ----- Message -----  From: Jacinta Carmichaelo - Cardiology Results In  Sent: 7/8/2025   3:45 PM EDT  To: Kylie Sawant, DO

## 2025-07-15 NOTE — TELEPHONE ENCOUNTER
Spoke with Ernesto and he is aware of these recommendations.  Please send the med over, and it looks like you put in for Cardiology instead of vascular. Is that correct?    Ernesto would like his lab results as well.  Please advise.

## 2025-07-15 NOTE — TELEPHONE ENCOUNTER
HIS CHOLESTEROL AND LIVER ENZYMES ARE SLIGHTLY ELEVATED   LOWER FAT DIET AND FOLLOW IN 6 MONTHS  THE PSA IS NORMAL AND THE REST